# Patient Record
Sex: MALE | Race: WHITE | NOT HISPANIC OR LATINO | URBAN - METROPOLITAN AREA
[De-identification: names, ages, dates, MRNs, and addresses within clinical notes are randomized per-mention and may not be internally consistent; named-entity substitution may affect disease eponyms.]

---

## 2018-02-09 ENCOUNTER — OUTPATIENT (OUTPATIENT)
Dept: OUTPATIENT SERVICES | Facility: HOSPITAL | Age: 63
LOS: 1 days | Discharge: HOME | End: 2018-02-09
Payer: MEDICAID

## 2018-02-09 ENCOUNTER — RESULT REVIEW (OUTPATIENT)
Age: 63
End: 2018-02-09

## 2018-02-09 VITALS
RESPIRATION RATE: 16 BRPM | WEIGHT: 220.02 LBS | HEIGHT: 73 IN | DIASTOLIC BLOOD PRESSURE: 91 MMHG | TEMPERATURE: 99 F | OXYGEN SATURATION: 96 % | HEART RATE: 68 BPM | SYSTOLIC BLOOD PRESSURE: 131 MMHG

## 2018-02-09 VITALS
RESPIRATION RATE: 16 BRPM | OXYGEN SATURATION: 99 % | SYSTOLIC BLOOD PRESSURE: 128 MMHG | HEART RATE: 66 BPM | DIASTOLIC BLOOD PRESSURE: 78 MMHG | TEMPERATURE: 99 F

## 2018-02-09 DIAGNOSIS — Z98.890 OTHER SPECIFIED POSTPROCEDURAL STATES: Chronic | ICD-10-CM

## 2018-02-09 DIAGNOSIS — Z90.49 ACQUIRED ABSENCE OF OTHER SPECIFIED PARTS OF DIGESTIVE TRACT: Chronic | ICD-10-CM

## 2018-02-09 PROCEDURE — 88304 TISSUE EXAM BY PATHOLOGIST: CPT | Mod: 26

## 2018-02-09 RX ORDER — OXYCODONE HYDROCHLORIDE 5 MG/1
10 TABLET ORAL EVERY 6 HOURS
Qty: 0 | Refills: 0 | Status: DISCONTINUED | OUTPATIENT
Start: 2018-02-09 | End: 2018-02-09

## 2018-02-09 RX ORDER — OXYCODONE HYDROCHLORIDE 5 MG/1
5 TABLET ORAL EVERY 4 HOURS
Qty: 0 | Refills: 0 | Status: DISCONTINUED | OUTPATIENT
Start: 2018-02-09 | End: 2018-02-09

## 2018-02-09 RX ORDER — ONDANSETRON 8 MG/1
4 TABLET, FILM COATED ORAL ONCE
Qty: 0 | Refills: 0 | Status: DISCONTINUED | OUTPATIENT
Start: 2018-02-09 | End: 2018-02-09

## 2018-02-09 RX ORDER — ONDANSETRON 8 MG/1
4 TABLET, FILM COATED ORAL EVERY 4 HOURS
Qty: 0 | Refills: 0 | Status: DISCONTINUED | OUTPATIENT
Start: 2018-02-09 | End: 2018-02-09

## 2018-02-09 RX ORDER — SODIUM CHLORIDE 9 MG/ML
1000 INJECTION, SOLUTION INTRAVENOUS
Qty: 0 | Refills: 0 | Status: DISCONTINUED | OUTPATIENT
Start: 2018-02-09 | End: 2018-02-24

## 2018-02-09 RX ORDER — SODIUM CHLORIDE 9 MG/ML
1000 INJECTION, SOLUTION INTRAVENOUS
Qty: 0 | Refills: 0 | Status: DISCONTINUED | OUTPATIENT
Start: 2018-02-09 | End: 2018-02-09

## 2018-02-09 RX ORDER — FENTANYL CITRATE 50 UG/ML
50 INJECTION INTRAVENOUS
Qty: 0 | Refills: 0 | Status: DISCONTINUED | OUTPATIENT
Start: 2018-02-09 | End: 2018-02-09

## 2018-02-09 NOTE — BRIEF OPERATIVE NOTE - POST-OP DX
Lateral meniscal tear  02/09/2018    Active  Zain Cardenas  Medial meniscus tear  02/09/2018    Active  Zain Cardenas

## 2018-02-09 NOTE — BRIEF OPERATIVE NOTE - PRE-OP DX
Lateral meniscal tear  02/09/2018  right  Active  Zain Cardenas  Medial meniscus tear  02/09/2018    Active  Zain Cardenas

## 2018-02-09 NOTE — ASU DISCHARGE PLAN (ADULT/PEDIATRIC). - SPECIAL INSTRUCTIONS
1) Ice is your friend for the next 2 weeks.  Your knee will swell over the next 48hours and you can expect pain to get a bit worse. Ice your Knee plenty, continuously if possible. Fill up a plastic bag, then wrap it in a towel or pillow case.     2) Elevate your leg above your heart with about 3 pilows when you are in bed or chair. Otherwise you should be up and about as much as possible. No sitting around. The more you move the better you will do. But you can rest too when you need it. Weight bearing as tolerated.    3) Expect some bloody drainage. This is usually  from the leftover fluid put inside you knee during surgery. It is normal. It may even soak through the gauze and ACE bandage and look bloody. Not to worry as this is mostly Saline fluid coming out of your knee leftover from surgery. Even a drop of blood can make it look very bloody. Simply place another Gauze and another ACE over it and wrap it snug but not too tight. If it bleeds through the second bandage again, you can call.    4) Remove bandage in 48hrs and place waterproof band aides. Can shower in 48 hours. No bath. Pat your incisions dry. No creams, no lotions.    5) Only reason to worry would be if pain got so severe that you cannot feel or wiggle your toes, or if your foot is cold. In this case you need to call and come to the ER. But as  long as you can feel and wiggle your toes, you should be fine.    6) Make sure you call the office to schedule a follow up appointment to be seen in 10-14 days. Your Sutures will be removed at that point.    7) A pain Rx is in the chart; fill it on your way home. OR was sent electronically to your pharmacy, pick it up on the way home.

## 2018-02-09 NOTE — ASU DISCHARGE PLAN (ADULT/PEDIATRIC). - NURSING INSTRUCTIONS
CALL the DOCTOR:   -  If you have  not urinated 8 hours after surgery or have any difficulty urinating.   - Call MD if pain medication is not working.      -A responsible adult should be with you for the rest of the day and night for your safety and to help you if you needed.    Review attached COLORFUL FACT SHEET

## 2018-02-13 LAB — SURGICAL PATHOLOGY FINAL REPORT - CH: SIGNIFICANT CHANGE UP

## 2018-02-15 DIAGNOSIS — M23.261 DERANGEMENT OF OTHER LATERAL MENISCUS DUE TO OLD TEAR OR INJURY, RIGHT KNEE: ICD-10-CM

## 2018-02-15 DIAGNOSIS — M23.231 DERANGEMENT OF OTHER MEDIAL MENISCUS DUE TO OLD TEAR OR INJURY, RIGHT KNEE: ICD-10-CM

## 2018-02-15 DIAGNOSIS — M17.12 UNILATERAL PRIMARY OSTEOARTHRITIS, LEFT KNEE: ICD-10-CM

## 2018-02-15 DIAGNOSIS — M17.11 UNILATERAL PRIMARY OSTEOARTHRITIS, RIGHT KNEE: ICD-10-CM

## 2019-02-07 PROBLEM — M25.561 PAIN IN RIGHT KNEE: Chronic | Status: ACTIVE | Noted: 2018-01-04

## 2019-02-12 ENCOUNTER — RECORD ABSTRACTING (OUTPATIENT)
Age: 64
End: 2019-02-12

## 2019-02-12 DIAGNOSIS — Z82.0 FAMILY HISTORY OF EPILEPSY AND OTHER DISEASES OF THE NERVOUS SYSTEM: ICD-10-CM

## 2019-02-12 DIAGNOSIS — S83.241A OTHER TEAR OF MEDIAL MENISCUS, CURRENT INJURY, RIGHT KNEE, INITIAL ENCOUNTER: ICD-10-CM

## 2019-02-12 DIAGNOSIS — Z82.61 FAMILY HISTORY OF ARTHRITIS: ICD-10-CM

## 2019-02-12 DIAGNOSIS — M17.11 UNILATERAL PRIMARY OSTEOARTHRITIS, RIGHT KNEE: ICD-10-CM

## 2019-02-12 DIAGNOSIS — Z80.1 FAMILY HISTORY OF MALIGNANT NEOPLASM OF TRACHEA, BRONCHUS AND LUNG: ICD-10-CM

## 2019-02-12 DIAGNOSIS — Z82.49 FAMILY HISTORY OF ISCHEMIC HEART DISEASE AND OTHER DISEASES OF THE CIRCULATORY SYSTEM: ICD-10-CM

## 2019-02-12 DIAGNOSIS — Z83.511 FAMILY HISTORY OF GLAUCOMA: ICD-10-CM

## 2019-02-12 DIAGNOSIS — N40.0 BENIGN PROSTATIC HYPERPLASIA WITHOUT LOWER URINARY TRACT SYMPMS: ICD-10-CM

## 2019-02-12 DIAGNOSIS — Z83.518 FAMILY HISTORY OF OTHER SPECIFIED EYE DISORDER: ICD-10-CM

## 2019-02-12 DIAGNOSIS — M25.561 PAIN IN RIGHT KNEE: ICD-10-CM

## 2019-02-12 DIAGNOSIS — Z78.9 OTHER SPECIFIED HEALTH STATUS: ICD-10-CM

## 2019-02-12 RX ORDER — NAPROXEN 500 MG/1
TABLET ORAL
Refills: 0 | Status: ACTIVE | COMMUNITY

## 2019-02-12 RX ORDER — TRIAMCINOLONE ACETONIDE 40 MG/ML
INJECTION, SUSPENSION INTRA-ARTICULAR; INTRAMUSCULAR
Refills: 0 | Status: ACTIVE | COMMUNITY

## 2019-02-12 RX ORDER — LIDOCAINE HYDROCHLORIDE 40 MG/ML
SOLUTION TOPICAL
Refills: 0 | Status: ACTIVE | COMMUNITY

## 2019-02-13 ENCOUNTER — APPOINTMENT (OUTPATIENT)
Age: 64
End: 2019-02-13
Payer: COMMERCIAL

## 2019-02-13 VITALS
BODY MASS INDEX: 27.83 KG/M2 | HEIGHT: 73 IN | DIASTOLIC BLOOD PRESSURE: 93 MMHG | WEIGHT: 210 LBS | SYSTOLIC BLOOD PRESSURE: 142 MMHG | HEART RATE: 84 BPM

## 2019-02-13 PROCEDURE — 20610 DRAIN/INJ JOINT/BURSA W/O US: CPT | Mod: LT

## 2019-02-13 PROCEDURE — 99213 OFFICE O/P EST LOW 20 MIN: CPT | Mod: 25

## 2019-02-19 NOTE — DISCUSSION/SUMMARY
[de-identified] : At this time,due to trochanteric bursitis of the left hip, I have recommended ice and elevation for the left hip and reassessment in 4-6 weeks.

## 2019-02-19 NOTE — PROCEDURE
[de-identified] : Consent: \par At this time, I have recommended an injection to the left hip.  The risks and benefits of the procedure were discussed with the patient in detail.  Upon verbal consent of the patient, we proceeded with the injection as noted below.  \par \par Procedure:  \par After a sterile prep, the patient underwent an injection of 9 cc of 1% Lidocaine without epinephrine and 1 cc of Kenalog into the left hip.  The patient tolerated the procedure well.  There were no complications.  \par \par .KS: ARTHROCENTESIS ASPIR&/INJECTION MAJOR JT/BURSA : 32925\par .KS: TRIAMCINOLONE ACET INJ NOS : \par .KS: LIDOCAINE INJECTION : \par \par

## 2019-02-19 NOTE — PHYSICAL EXAM
[de-identified] : Left Hip: Range of Motion in Degrees:\par 	                                 Claimant:	          Normal:	\par Flexion (Active) 	                 120 	          120-degrees	\par Flexion (Passive)	                 120	          120-degrees	\par Extension (Active)	                 -30	          -30-degrees	\par Extension (Passive)	 -30	          -30-degrees	\par Abduction (Active)	                45-50	          86-66-ygdgnsa	\par Abduction (Passive)	45-50	          26-95-mtbtqlb	\par Adduction (Active)                	20-30	          03-39-cfxcpbm	\par Adduction (Passive)	20-30	          09-82-rcykorl	\par Internal Rotation (Active) 	35	          35-degrees	\par Internal Rotation (Passive)	35	          35-degrees	\par External Rotation (Active)	45	          45-degrees	\par External Rotation (Passive)	45	          45-degrees	\par \par No tenderness with internal or external rotation or axial load.  Point tenderness over the greater trochanter with pain with resisted abduction.  Negative Trendelenburg.  No weakness to flexion, extension, abduction or adduction.  No evidence of instability.  No motor or sensory deficits.  2+ DP and PT pulses.  Skin is intact.  No scars, rashes or lesions.  \par   [de-identified] : Gait and Station:  Ambulating with a slightly antalgic to antalgic gait.  Station:  Normal.  [de-identified] : Appearance:  Well-developed, well-nourished male in no acute distress.

## 2019-02-19 NOTE — ADDENDUM
[FreeTextEntry1] : This note was written by Raegan Ngo on 02/18/2019 acting as scribe for ZACHARY Acharya MD

## 2019-02-28 ENCOUNTER — APPOINTMENT (OUTPATIENT)
Age: 64
End: 2019-02-28
Payer: COMMERCIAL

## 2019-02-28 VITALS
TEMPERATURE: 98.3 F | BODY MASS INDEX: 27.57 KG/M2 | HEART RATE: 96 BPM | WEIGHT: 208 LBS | HEIGHT: 73 IN | SYSTOLIC BLOOD PRESSURE: 146 MMHG | OXYGEN SATURATION: 99 % | DIASTOLIC BLOOD PRESSURE: 82 MMHG

## 2019-02-28 DIAGNOSIS — N52.9 MALE ERECTILE DYSFUNCTION, UNSPECIFIED: ICD-10-CM

## 2019-02-28 PROCEDURE — 99213 OFFICE O/P EST LOW 20 MIN: CPT

## 2019-02-28 NOTE — END OF VISIT
[FreeTextEntry3] : This patient has stable prostatism but erectile dysfunction which he wishes to remedy. I discussed the beneficial as most formal product and he will consider this option no a few wishes me to prescribe

## 2019-02-28 NOTE — HISTORY OF PRESENT ILLNESS
[FreeTextEntry1] : This patient presents for evaluation of erectile dysfunction. He was seen in the past and medications had been recommended but price was an issue for him. He presents with similar symptoms. He has some mild lower tract symptoms and desire murmur a PSA in the past.

## 2019-03-08 PROBLEM — N52.9 ERECTILE DYSFUNCTION: Status: ACTIVE | Noted: 2019-02-28

## 2019-03-08 RX ORDER — SILDENAFIL 20 MG/1
20 TABLET ORAL
Qty: 50 | Refills: 11 | Status: ACTIVE | COMMUNITY
Start: 2019-03-08 | End: 1900-01-01

## 2019-06-24 ENCOUNTER — APPOINTMENT (OUTPATIENT)
Dept: ORTHOPEDIC SURGERY | Facility: CLINIC | Age: 64
End: 2019-06-24
Payer: COMMERCIAL

## 2019-06-24 VITALS
WEIGHT: 210 LBS | HEIGHT: 73 IN | TEMPERATURE: 98.3 F | HEART RATE: 74 BPM | BODY MASS INDEX: 27.83 KG/M2 | DIASTOLIC BLOOD PRESSURE: 77 MMHG | SYSTOLIC BLOOD PRESSURE: 144 MMHG

## 2019-06-24 DIAGNOSIS — M70.62 TROCHANTERIC BURSITIS, LEFT HIP: ICD-10-CM

## 2019-06-24 DIAGNOSIS — M16.12 UNILATERAL PRIMARY OSTEOARTHRITIS, LEFT HIP: ICD-10-CM

## 2019-06-24 PROCEDURE — 99214 OFFICE O/P EST MOD 30 MIN: CPT

## 2019-06-26 PROBLEM — M70.62 TROCHANTERIC BURSITIS OF LEFT HIP: Status: ACTIVE | Noted: 2019-02-12

## 2019-06-26 NOTE — HISTORY OF PRESENT ILLNESS
[de-identified] : The patient comes in today for his left hip.  He states he did get some short term relief but the pain has recurred.

## 2019-06-26 NOTE — PHYSICAL EXAM
[de-identified] : Left Hip: \par Hip: Range of Motion in Degrees:\par 	                                  Claimant:	Normal:	\par Flexion (Active) 	                  120 	                120-degrees	\par Flexion (Passive)	                  120	                120-degrees	\par Extension (Active)	                  -30	                -30-degrees	\par Extension (Passive)	  -30	                -30-degrees	\par Abduction (Active)	                  45-50	                89-09-adltymf	\par Abduction (Passive)	  45-50	                29-95-agbgrrf	\par Adduction (Active)	                  20-30	                35-07-wgtjvce	\par Adduction (Passive)	  20-30	                08-53-qrsryec	\par Internal Rotation (Active) 	 35	                35-degrees	\par Internal Rotation (Passive)	 35	                35-degrees	\par External Rotation (Active)	 45	                45-degrees	\par External Rotation (Passive)	 45	                45-degrees	\par \par Tenderness into the groin with internal and external rotation and axial load.  No tenderness to palpation over the greater trochanter.  Negative Trendelenburg.  No tenderness with resisted abduction.  No weakness to flexion, extension, abduction or adduction.  No evidence of instability.  No motor or sensory deficits.  2+ DP and PT pulses.  Skin is intact.  No scars, rashes or lesions.  \par  [de-identified] : Ambulating with a slightly antalgic to antalgic gait.  Station:  Normal.  [de-identified] : General Appearance:  Well-developed, well-nourished male in no acute distress.

## 2019-06-26 NOTE — ADDENDUM
[FreeTextEntry1] : This note was written by Kathia Mendez on 06/26/2019 acting as scribe for Juan Carlos Shepherd III, MD\par

## 2019-06-26 NOTE — DISCUSSION/SUMMARY
[de-identified] : At this time, I have recommended that he undergo an intra-articular injection for the left hip osteoarthritis with recurrent trochanteric bursitis.  He has been referred for such.  \par \par The patient has been advised that his blood pressure today was outside normal ranges and the patient was instructed accordingly. Our Blood Pressure Monitoring Sheet with instructions was given to the patient.\par

## 2019-06-27 ENCOUNTER — TRANSCRIPTION ENCOUNTER (OUTPATIENT)
Age: 64
End: 2019-06-27

## 2021-01-28 ENCOUNTER — TRANSCRIPTION ENCOUNTER (OUTPATIENT)
Age: 66
End: 2021-01-28

## 2021-02-03 ENCOUNTER — APPOINTMENT (OUTPATIENT)
Dept: ORTHOPEDIC SURGERY | Facility: CLINIC | Age: 66
End: 2021-02-03
Payer: COMMERCIAL

## 2021-02-03 VITALS
HEART RATE: 81 BPM | HEIGHT: 73 IN | WEIGHT: 210 LBS | DIASTOLIC BLOOD PRESSURE: 84 MMHG | SYSTOLIC BLOOD PRESSURE: 144 MMHG | BODY MASS INDEX: 27.83 KG/M2

## 2021-02-03 DIAGNOSIS — M19.012 PRIMARY OSTEOARTHRITIS, LEFT SHOULDER: ICD-10-CM

## 2021-02-03 DIAGNOSIS — M19.011 PRIMARY OSTEOARTHRITIS, RIGHT SHOULDER: ICD-10-CM

## 2021-02-03 PROCEDURE — 73030 X-RAY EXAM OF SHOULDER: CPT | Mod: 50

## 2021-02-03 PROCEDURE — 99213 OFFICE O/P EST LOW 20 MIN: CPT

## 2021-02-03 PROCEDURE — 99072 ADDL SUPL MATRL&STAF TM PHE: CPT

## 2021-02-03 RX ORDER — MELOXICAM 15 MG/1
15 TABLET ORAL DAILY
Qty: 30 | Refills: 0 | Status: ACTIVE | COMMUNITY
Start: 2021-02-03 | End: 1900-01-01

## 2021-02-04 DIAGNOSIS — Z78.9 OTHER SPECIFIED HEALTH STATUS: ICD-10-CM

## 2021-02-04 DIAGNOSIS — F17.200 NICOTINE DEPENDENCE, UNSPECIFIED, UNCOMPLICATED: ICD-10-CM

## 2021-02-17 NOTE — HISTORY OF PRESENT ILLNESS
[3] : the relief from medicine is 3/10 [de-identified] : The patient comes in today with complaints of pain to his bilateral shoulders, right worse than left.  He had an injection on the right and felt a little bit better but now the pain has recurred.  The patient states the pain is intermittent.  The patient describes the pain as dull, sharp, aching, burning, throbbing, cramping, shooting and stabbing.  The patient states rest makes the pain better while reaching behind makes the pain worse.\par \par Pain level includes a current pain level of 9/10.\par \par Ailment Interference:  \par Daily Living:  10/10\par Normal Work:  10/10\par Sleep:  10/10\par Enjoyment of Life:  10/10\par Climbing Stairs:  0/10\par Sports:  10/10\par Extra-Curricular Activities:  10/10 [] : No [de-identified] : Naproxen

## 2021-02-17 NOTE — DISCUSSION/SUMMARY
[de-identified] : At this time, I have recommended that he start on a course of physical therapy and topical anti-inflammatory cream for the bilateral shoulder osteoarthritis, right worse than left, with possible impingement on the right.  He will be reassessed in three to four weeks.

## 2021-02-17 NOTE — ADDENDUM
[FreeTextEntry1] : This note was written by Kathia Mendez on 02/05/2021 acting as scribe for Juan Carlos Shepherd III, MD

## 2021-02-17 NOTE — PHYSICAL EXAM
[de-identified] : Right Shoulder: \par Range of Motion in Degrees:  	\par 	                                  Claimant:	Normal:	\par Abduction (Active)	                  110	               180 degrees	\par Abduction (Passive)	  110	               180 degrees	\par Forward elevation (Active):	  110	               180 degrees	\par Forward elevation (Passive):	  110	               180 degrees	\par External rotation (Active):	   45	               45 degrees	\par External rotation (Passive):	   45	               45 degrees	\par Internal rotation (Active):	   L-1	               L-1	\par Internal rotation (Passive):	   L-1	               L-1	\par \par Diffuse crepitations and tenderness throughout range of motion.  Pain and swelling throughout range of motion, more significant at extremes.  No motor weakness to internal rotation, external rotation or abduction in the scapular plane.  Negative crank test.  Negative O’Jairo’s test.  Negative Speed’s test.  Negative Yergason’s test.  Negative cross arm test.  No tenderness to palpation at the AC joint.  Positive Hawkin’s sign.  Positive Neer’s sign.  Negative apprehension. Negative sulcus sign.  No gross neurological or vascular deficits distally.  Skin is intact.  No rashes, scars or lesions.  2+ radial and ulnar pulses. No extra-articular swelling or tenderness.\par  \par Left Shoulder: \par Range of Motion in Degrees:  	\par 	                                  Claimant:	Normal:	\par Abduction (Active)	                  180	               180 degrees	\par Abduction (Passive)	  180	               180 degrees	\par Forward elevation (Active):	  180	               180 degrees	\par Forward elevation (Passive):	  180	               180 degrees	\par External rotation (Active):	   45	               45 degrees	\par External rotation (Passive):	   45	               45 degrees	\par Internal rotation (Active):	   L-1	               L-1	\par Internal rotation (Passive):	   L-1	               L-1	\par \par Diffuse crepitations and tenderness throughout range of motion.  Pain and swelling throughout range of motion, more significant at extremes.  No motor weakness to internal rotation, external rotation or abduction in the scapular plane.  Negative crank test.  Negative O’Jairo’s test.  Negative Speed’s test.  Negative Yergason’s test.  Negative cross arm test.  No tenderness to palpation at the AC joint.  Negative Hawkin’s sign.  Negative Neer’s sign.  Negative apprehension. Negative sulcus sign.  No gross neurological or vascular deficits distally.  Skin is intact.  No rashes, scars or lesions.  2+ radial and ulnar pulses. No extra-articular swelling or tenderness.\par  [de-identified] : Ambulating with a normal gait.  Station:  Normal.  [de-identified] : General Appearance:  Well-developed, well-nourished male in no acute distress. \par  [de-identified] : Radiographs, two views of the right shoulder and two views of the left shoulder, show late moderate arthritis on the right and moderate on the left.

## 2021-03-02 ENCOUNTER — RX RENEWAL (OUTPATIENT)
Age: 66
End: 2021-03-02

## 2021-03-02 RX ORDER — MELOXICAM 7.5 MG/1
7.5 TABLET ORAL
Qty: 30 | Refills: 0 | Status: ACTIVE | COMMUNITY
Start: 2021-02-04 | End: 1900-01-01

## 2021-08-24 ENCOUNTER — TRANSCRIPTION ENCOUNTER (OUTPATIENT)
Age: 66
End: 2021-08-24

## 2022-04-11 DIAGNOSIS — N13.8 BENIGN PROSTATIC HYPERPLASIA WITH LOWER URINARY TRACT SYMPMS: ICD-10-CM

## 2022-04-11 DIAGNOSIS — N40.1 BENIGN PROSTATIC HYPERPLASIA WITH LOWER URINARY TRACT SYMPMS: ICD-10-CM

## 2022-06-01 ENCOUNTER — NON-APPOINTMENT (OUTPATIENT)
Age: 67
End: 2022-06-01

## 2023-01-12 ENCOUNTER — NON-APPOINTMENT (OUTPATIENT)
Age: 68
End: 2023-01-12

## 2023-02-20 ENCOUNTER — NON-APPOINTMENT (OUTPATIENT)
Age: 68
End: 2023-02-20

## 2023-03-20 ENCOUNTER — APPOINTMENT (OUTPATIENT)
Dept: OTOLARYNGOLOGY | Facility: CLINIC | Age: 68
End: 2023-03-20

## 2023-04-12 ENCOUNTER — APPOINTMENT (OUTPATIENT)
Dept: OTOLARYNGOLOGY | Facility: CLINIC | Age: 68
End: 2023-04-12
Payer: COMMERCIAL

## 2023-04-12 VITALS
DIASTOLIC BLOOD PRESSURE: 81 MMHG | HEIGHT: 73 IN | BODY MASS INDEX: 27.17 KG/M2 | WEIGHT: 205 LBS | TEMPERATURE: 97.3 F | SYSTOLIC BLOOD PRESSURE: 133 MMHG

## 2023-04-12 DIAGNOSIS — H69.83 OTHER SPECIFIED DISORDERS OF EUSTACHIAN TUBE, BILATERAL: ICD-10-CM

## 2023-04-12 DIAGNOSIS — R26.89 OTHER ABNORMALITIES OF GAIT AND MOBILITY: ICD-10-CM

## 2023-04-12 DIAGNOSIS — H90.3 SENSORINEURAL HEARING LOSS, BILATERAL: ICD-10-CM

## 2023-04-12 DIAGNOSIS — R42 DIZZINESS AND GIDDINESS: ICD-10-CM

## 2023-04-12 PROCEDURE — 92557 COMPREHENSIVE HEARING TEST: CPT

## 2023-04-12 PROCEDURE — 99204 OFFICE O/P NEW MOD 45 MIN: CPT

## 2023-04-12 PROCEDURE — 92567 TYMPANOMETRY: CPT

## 2023-04-12 RX ORDER — PREDNISONE 20 MG/1
20 TABLET ORAL
Qty: 15 | Refills: 2 | Status: ACTIVE | COMMUNITY
Start: 2023-04-12 | End: 1900-01-01

## 2023-04-12 NOTE — PHYSICAL EXAM
[Midline] : trachea located in midline position [Normal] : no rashes [] : Romberg test is negative [de-identified] : gait steady, no nystag head shake neg

## 2023-04-12 NOTE — HISTORY OF PRESENT ILLNESS
[de-identified] : onset vertigo 4 mo ago room spinning, constant x 2 days, nausea\par to Urgent Care \par at time as congestion and tinnitus\par similar episodes 3 more times last one 10 d ago \par still of balance now\par not triggered by motion\par no focal weakness or numbness\par neg hx migraine

## 2023-04-12 NOTE — ASSESSMENT
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

## 2023-04-24 ENCOUNTER — NON-APPOINTMENT (OUTPATIENT)
Age: 68
End: 2023-04-24

## 2023-04-27 ENCOUNTER — NON-APPOINTMENT (OUTPATIENT)
Age: 68
End: 2023-04-27

## 2024-04-10 NOTE — ASU PREOP CHECKLIST - BMI (KG/M2)
< from: Transthoracic Echocardiogram (10.18.18 @ 08:56) >    Conclusions:  1. Normal left ventricular internal dimensions and wall  thicknesses.  2. Normal left ventricular systolic function. No segmental  wall motion abnormalities.  3. Normal diastolic function  4. Normal right ventricular size and function.    < end of copied text > 29

## 2025-07-29 ENCOUNTER — EMERGENCY (EMERGENCY)
Facility: HOSPITAL | Age: 70
LOS: 0 days | Discharge: HOME | End: 2025-07-29
Attending: EMERGENCY MEDICINE
Payer: COMMERCIAL

## 2025-07-29 VITALS
RESPIRATION RATE: 18 BRPM | SYSTOLIC BLOOD PRESSURE: 129 MMHG | TEMPERATURE: 98 F | OXYGEN SATURATION: 100 % | HEART RATE: 75 BPM | WEIGHT: 208.12 LBS | DIASTOLIC BLOOD PRESSURE: 68 MMHG

## 2025-07-29 VITALS — WEIGHT: 195.11 LBS | HEIGHT: 73 IN
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
## 2025-07-29 DIAGNOSIS — M79.644 PAIN IN RIGHT FINGER(S): ICD-10-CM

## 2025-07-29 DIAGNOSIS — Z23 ENCOUNTER FOR IMMUNIZATION: ICD-10-CM
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
## 2025-07-29 DIAGNOSIS — Z90.49 ACQUIRED ABSENCE OF OTHER SPECIFIED PARTS OF DIGESTIVE TRACT: Chronic | ICD-10-CM

## 2025-07-29 DIAGNOSIS — Z98.890 OTHER SPECIFIED POSTPROCEDURAL STATES: Chronic | ICD-10-CM

## 2025-07-29 DIAGNOSIS — S61.210A LACERATION WITHOUT FOREIGN BODY OF RIGHT INDEX FINGER WITHOUT DAMAGE TO NAIL, INITIAL ENCOUNTER: ICD-10-CM

## 2025-07-29 PROCEDURE — 73140 X-RAY EXAM OF FINGER(S): CPT | Mod: RT

## 2025-07-29 PROCEDURE — 99285 EMERGENCY DEPT VISIT HI MDM: CPT

## 2025-07-29 PROCEDURE — 73140 X-RAY EXAM OF FINGER(S): CPT | Mod: 26,RT

## 2025-07-29 PROCEDURE — 12001 RPR S/N/AX/GEN/TRNK 2.5CM/<: CPT

## 2025-07-29 PROCEDURE — 90471 IMMUNIZATION ADMIN: CPT

## 2025-07-29 PROCEDURE — 90715 TDAP VACCINE 7 YRS/> IM: CPT

## 2025-07-29 PROCEDURE — 99284 EMERGENCY DEPT VISIT MOD MDM: CPT | Mod: 25

## 2025-07-29 RX ORDER — CEPHALEXIN 250 MG/1
500 CAPSULE ORAL ONCE
Refills: 0 | Status: COMPLETED | OUTPATIENT
Start: 2025-07-29 | End: 2025-07-29

## 2025-07-29 NOTE — ED STATDOCS - PATIENT PORTAL LINK FT
You can access the FollowMyHealth Patient Portal offered by [facility] by registering at the following website: [email or web address]. By joining [employer]’s FollowMyHealth portal, you will also be able to view your health information using other applications (apps) compatible with our system.

## 2025-07-29 NOTE — ED ADULT NURSE NOTE - OBJECTIVE STATEMENT
Pt is a 70yr old male, A&OX4 and ambulatory, c/o R 2nd finger getting slammed on a metal door. +laceration, bleeding controlled at this time. Unknown if tetanus is UTD. No other reported injuries. Pt in no acute distress.

## 2025-07-29 NOTE — ED STATDOCS - OBJECTIVE STATEMENT
70 year old male with no pertinent PMHx presents to ED c/o R sided finger pain. Patient states he was closing a metal door and his left finger got slammed 45 minutes PTA. Patient is unsure if tetanus shot is UTD

## 2025-07-29 NOTE — ED STATDOCS - PROGRESS NOTE DETAILS
HANNA Nuñez: XR with possible questionable fx in distal phalanx vs arthritic changes.   Spoke with hand who will evaluate pt in the ED. Will give abx. HANNA Nuñez: 71 y/o M with no reported pmhx presents to the ED s/p crush injury to right index finger. Pt states his finger got caught in a metal door. was able to remove his finger. Pt is left hand dominant. Unknown when last tdap was. Reports difficulty with moving his finger.   Physical exam: 1.5 cm laceration to dorsum of 2nd digit on right hand that extends into lateral nail fold. no nailbed involvement. 1cm lac to palmar aspect of 2nd digit on right hand right over DIP joint. Limited flexion at DIP joint.  Plan: XR, lac repair. update tetanus. HANNA Nuñez: pt evaluated by hand. recommends d/c with f/u as outpatient. does not recommend abx at this time.

## 2025-07-29 NOTE — ED STATDOCS - PHYSICAL EXAMINATION
Constitutional: NAD AAOx3  Eyes: EOMI, pupils equal  Head: Normocephalic atraumatic  Mouth: no airway obstruction  Cardiac: regular rate   Resp: Lungs CTAB  GI: Abd s/nt/nd  Neuro: CN2-12 intact  Skin: No rashes   MSK:  right second digit 1 half cm lac distal finger perpendicular to nail bed cap refill less than 2 seconds sensations intact distal nerve motor intact

## 2025-07-29 NOTE — ED STATDOCS - NSFOLLOWUPINSTRUCTIONS_ED_ALL_ED_FT
You were seen in the ED for a finger injury.     please follow-up with Dr. Abad within the week. Call the office to make an appointment.     For pain; Take Tylenol 650mg (Two regular strength 325 mg pills) every 4-6 hours or two extra strength 500mg tablets every 8 hours as needed for pain or fevers. Do not exceed 1000mg at one time or 4000mg in a 24 hour period. Take Motrin (also sold as Advil or Ibuprofen) 400-600 mg (two or three 200 mg over the counter pills) every 8 hours as needed for moderate pain or fevers-- take with food; do not take for more than 5 consecutive days.     Keep extremity elevated and wrapped.  Apply cool compresses to affected area for 15 minutes, 3-4 times per day.    Return to the ED for any new or worsening symptoms including fever/chills, drainage of pus, severe pain or swelling that does not get better with medications, red streaking going up the arm, numbness in the finger.

## 2025-07-29 NOTE — ED STATDOCS - NSICDXPASTMEDICALHX_GEN_ALL_CORE_FT
Subjective


Date of Service: 10/06/18


Primary Care Provider: None


Chief Complaint: Abdominal pain


Subjective: Other (Patient improved.  Pain to the abdomen is less.  Patient 

tolerated clear liquids.)





Physical Examination





- Vital Signs


Temperature: 98 F


Blood Pressure: 125/62


Pulse: 58


Respirations: 18


Pulse Ox (%): 100





- Physical Exam


General: Alert, In no apparent distress, Oriented x3, Cooperative


HEENT: Atraumatic


Neck: Supple


Respiratory: Clear to auscultation bilaterally, Normal air movement


Cardiovascular: Normal pulses, Regular rate/rhythm


Gastrointestinal: Normal bowel sounds, Soft and benign, Non-distended, No masses

, No rebound, No guarding, Tenderness (Pain to the epigastric region improved)


Musculoskeletal: No erythema, No tenderness, No warmth


Integumentary: No tenderness/swelling, No erythema, No warmth, No cyanosis


Neurological: Normal speech, Normal strength at 5/5 x4 extr, Normal tone





- Studies


Medications List Reviewed: Yes





Assessment & Plan


Discharge Plan: Home


Plan to discharge in: 24 Hours


Physician Review Additional Text: 





Impression:


Nausea, vomiting and epigastric pain likely enteritis


Positive drug screen for THC





Plan:


Nausea, vomiting and epigastric pain likely enteritis


Positive drug screen for THC





Will advance diet to full liquid this morning.  If improved will advance to 

soft.  So far abdominal ultrasound and CT scan unremarkable.  Case discussed 

with surgery yesterday.  No surgical intervention required.  Will continue with 

IV fluids, antibiotic therapy-Cipro and Flagyl.  Patient also on Protonix.  THC 

education cessation addressed in detail with the patient.  Encourage 

ambulation.  Anticipate discharge in the next 24 hr. 


Time Spent Managing Pts Care (In Minutes): 55 PAST MEDICAL HISTORY:  Knee pain, right

## 2025-07-29 NOTE — ED ADULT TRIAGE NOTE - CHIEF COMPLAINT QUOTE
pointer finger on R hand got stuck between chain and bottom of tailgate. unknown if tetanus is UTD. finger is not active bleeding at this time.

## 2025-07-29 NOTE — ED STATDOCS - CARE PROVIDER_API CALL
Mk Abad)  Orthopaedic Surgery  [address] [place] [id number]  Phone: [phone]  Fax: [phone]  Follow Up Time: 4-6 Days

## 2025-07-29 NOTE — CONSULT NOTE ADULT - SUBJECTIVE AND OBJECTIVE BOX
70y Male community ambulatory presents c/o R index finger Laceration after slamming finger in metal door of [facility]. Denies HS/LOC. Denies numbness/tingling. No other pain/injuries. Denies fevers/chills. LHD. Avid bagpipe and .      HEALTH ISSUES - PROBLEM Dx:        MEDICATIONS  (STANDING):    Allergies    Allergy Status Unknown    Intolerances      Vital Signs Last 24 Hrs  T(C): 36.7 (07-29-25 @ 11:05), Max: 36.7 (07-29-25 @ 11:05)  T(F): 98 (07-29-25 @ 11:05), Max: 98 (07-29-25 @ 11:05)  HR: 75 (07-29-25 @ 11:05) (75 - 75)  BP: 129/68 (07-29-25 @ 11:05) (129/68 - 129/68)  BP(mean): 83 (07-29-25 @ 11:05) (83 - 83)  RR: 18 (07-29-25 @ 11:05) (18 - 18)  SpO2: 100% (07-29-25 @ 11:05) (100% - 100%)      Imaging: x-ray shows no fx or dislocation    Physical Exam  Gen: NAD  R index finger: Warm/well perfused, at dorsal aspect just proximal to cuticle, 1-2cm laceration noted, no active pulsatile bleeding. Patient sensation intact to tip of finger with intact flexion/extension at DIP, FDS/FDP intact.      Procedure: R hand was prepped and draped in sterile fashion, 10cc of 1% lidocaine was used to perform a metacarpal digit block of the R 2nd finger. The laceration was cleaned and approximated using 5-0 chromic gut. The tourniquet was let down with subsequent brisk capillary refill at the tip of the digit. The laceration was dressed with xeroform and gauze. The patient tolerated the procedure well. NVI post-procedure.       A/P: 70y Male with R index finger laceration s/p repair  Pain control  Ice/elevate as needed  Keep dressing clean and dry   All question answered  Follow up with Dr. Abad as outpatient within 1 week, call office for appointment   Ortho stable for discharge